# Patient Record
Sex: FEMALE | Race: WHITE | NOT HISPANIC OR LATINO | Employment: STUDENT | URBAN - METROPOLITAN AREA
[De-identification: names, ages, dates, MRNs, and addresses within clinical notes are randomized per-mention and may not be internally consistent; named-entity substitution may affect disease eponyms.]

---

## 2023-05-08 ENCOUNTER — OFFICE VISIT (OUTPATIENT)
Age: 23
End: 2023-05-08

## 2023-05-08 VITALS
RESPIRATION RATE: 20 BRPM | HEIGHT: 64 IN | TEMPERATURE: 97.8 F | SYSTOLIC BLOOD PRESSURE: 114 MMHG | WEIGHT: 145 LBS | OXYGEN SATURATION: 100 % | DIASTOLIC BLOOD PRESSURE: 66 MMHG | HEART RATE: 72 BPM | BODY MASS INDEX: 24.75 KG/M2

## 2023-05-08 DIAGNOSIS — H61.20 WAX IN EAR: Primary | ICD-10-CM

## 2023-05-08 RX ORDER — NORGESTIMATE AND ETHINYL ESTRADIOL 7DAYSX3 28
1 KIT ORAL DAILY
COMMUNITY
Start: 2023-04-03

## 2023-05-08 NOTE — PATIENT INSTRUCTIONS
Ears do not appear infected at this time from the parts of the ear drum that I can see  There is a large amount of earwax in the right ear that is obscuring over 75% of your right ear drum  OTC Debrox is recommended - 3 drops in both ears about 5 minutes before you shower  It will help to soften the ear wax in the ears so it can naturally drain out  Try not to use Q-Tips to clean the ears because it can cause wax to get pushed in further instead  Follow up with PCP in 3-5 days  Proceed to ER if symptoms worsen

## 2023-05-08 NOTE — PROGRESS NOTES
[facility]        NAME: Florencia Cleveland is a 26 y o  female  : 2000    MRN: [id number]  DATE: May 8, 2023  TIME: 7:07 PM    Assessment and Plan   Wax in ear [H61 20]  1  Wax in ear              Patient Instructions   Ears do not appear infected at this time from the parts of the ear drum that I can see  There is a large amount of earwax in the right ear that is obscuring over 75% of your right ear drum  Follow up with PCP in 3-5 days  Proceed to ER if symptoms worsen  Chief Complaint     Chief Complaint   Patient presents with   • Earache     Right ear feels clogged with limited pain x 2 days         History of Present Illness       Muffled hearing and feeling of clogged ear on the right side starting 2 days ago  Earache   Pertinent negatives include no abdominal pain, coughing, ear discharge, rash, sore throat or vomiting  Review of Systems   Review of Systems   Constitutional: Negative for chills and fever  HENT: Positive for ear pain  Negative for ear discharge and sore throat  Eyes: Negative for pain and visual disturbance  Respiratory: Negative for cough and shortness of breath  Cardiovascular: Negative for chest pain and palpitations  Gastrointestinal: Negative for abdominal pain and vomiting  Genitourinary: Negative for dysuria and hematuria  Musculoskeletal: Negative for arthralgias and back pain  Skin: Negative for color change and rash  Neurological: Negative for seizures and syncope  All other systems reviewed and are negative          Current Medications       Current Outpatient Medications:   •  norgestimate-ethinyl estradiol (ORTHO TRI-CYCLEN,TRINESSA) 0 18/0 215/0 25 MG-35 MCG per tablet, Take 1 tablet by mouth daily, Disp: , Rfl:     Current Allergies     Allergies as of 2023   • (No Known Allergies)            The following portions of the patient's history were reviewed and updated as appropriate: allergies, current medications, past "family history, past medical history, past social history, past surgical history and problem list      Past Medical History:   Diagnosis Date   • Known health problems: none        Past Surgical History:   Procedure Laterality Date   • NO PAST SURGERIES         History reviewed  No pertinent family history  Medications have been verified  Objective   /66   Pulse 72   Temp 97 8 °F (36 6 °C) (Tympanic)   Resp 20   Ht 5' 4\" (1 626 m)   Wt 65 8 kg (145 lb)   LMP 04/24/2023   SpO2 100%   BMI 24 89 kg/m²   Patient's last menstrual period was 04/24/2023  Physical Exam     Physical Exam  HENT:      Head: Normocephalic  Right Ear: Tympanic membrane, ear canal and external ear normal  There is impacted cerumen  Left Ear: Tympanic membrane, ear canal and external ear normal       Nose: Nose normal       Mouth/Throat:      Mouth: Mucous membranes are moist    Eyes:      Pupils: Pupils are equal, round, and reactive to light  Pulmonary:      Effort: Pulmonary effort is normal    Musculoskeletal:      Cervical back: Normal range of motion and neck supple  No tenderness  Skin:     General: Skin is warm and dry  Capillary Refill: Capillary refill takes less than 2 seconds  Neurological:      General: No focal deficit present  Mental Status: She is alert and oriented to person, place, and time  Mental status is at baseline                     "

## 2023-12-04 ENCOUNTER — OFFICE VISIT (OUTPATIENT)
Age: 23
End: 2023-12-04
Payer: COMMERCIAL

## 2023-12-04 VITALS
HEIGHT: 64 IN | TEMPERATURE: 97.2 F | SYSTOLIC BLOOD PRESSURE: 104 MMHG | DIASTOLIC BLOOD PRESSURE: 70 MMHG | BODY MASS INDEX: 22.96 KG/M2 | RESPIRATION RATE: 16 BRPM | WEIGHT: 134.48 LBS | HEART RATE: 91 BPM | OXYGEN SATURATION: 100 %

## 2023-12-04 DIAGNOSIS — J01.10 ACUTE NON-RECURRENT FRONTAL SINUSITIS: Primary | ICD-10-CM

## 2023-12-04 LAB — S PYO AG THROAT QL: NEGATIVE

## 2023-12-04 PROCEDURE — 99213 OFFICE O/P EST LOW 20 MIN: CPT

## 2023-12-04 PROCEDURE — 87880 STREP A ASSAY W/OPTIC: CPT

## 2023-12-04 RX ORDER — AMOXICILLIN 875 MG/1
875 TABLET, COATED ORAL 2 TIMES DAILY
Qty: 14 TABLET | Refills: 0 | Status: SHIPPED | OUTPATIENT
Start: 2023-12-04 | End: 2023-12-11

## 2023-12-04 NOTE — LETTER
December 4, 2023     Patient: Soha Liang   YOB: 2000   Date of Visit: 12/4/2023       To Whom it May Concern:    Soha Liang was seen in my clinic on 12/4/2023. She may return to school/Work on 12/5/2023. If you have any questions or concerns, please don't hesitate to call.          Sincerely,          JOSEF Ervin        CC: No Recipients

## 2023-12-04 NOTE — PATIENT INSTRUCTIONS
Strep A test was negative in the office today. You have a bacterial sinus infection at this time that I have sent antibiotics to the pharmacy to help you treat the infection. Continue taking Sudafed and other OTC cold medications to help with your symptoms while your body fights this infection. Follow up with PCP in 3-5 days if not improving. Proceed to ER if symptoms worsen.

## 2023-12-04 NOTE — PROGRESS NOTES
[facility]        NAME: Cinthia Marion is a 26 y.o. female  : 2000    MRN: [id number]  DATE: 2023  TIME: 11:38 AM    Assessment and Plan   Acute non-recurrent frontal sinusitis [J01.10]  1. Acute non-recurrent frontal sinusitis  POCT rapid strepA    amoxicillin (AMOXIL) 875 mg tablet            Patient Instructions   Strep A test was negative in the office today. You have a bacterial sinus infection at this time that I have sent antibiotics to the pharmacy to help you treat the infection. Continue taking Sudafed and other OTC cold medications to help with your symptoms while your body fights this infection. Follow up with PCP in 3-5 days if not improving. Proceed to ER if symptoms worsen. Chief Complaint     Chief Complaint   Patient presents with   • Sore Throat     Congestion x2 weeks. OTC cold meds and sudafed. Woke up this morning with swollen tonsils and "white stuff in throat". States that throat does not hurt very badly. Denies fevers. History of Present Illness       Congestion for 2 weeks, was feeling better 2 days ago and then worse yesterday. Woke up this morning with sore throat and "white stuff" on her throat. Review of Systems   Review of Systems   Constitutional:  Negative for chills and fever. HENT:  Positive for congestion, sinus pressure and sore throat. Negative for ear pain and sinus pain. Eyes:  Negative for pain and visual disturbance. Respiratory:  Negative for cough and shortness of breath. Cardiovascular:  Negative for chest pain and palpitations. Gastrointestinal:  Negative for abdominal pain and vomiting. Genitourinary:  Negative for dysuria and hematuria. Musculoskeletal:  Positive for myalgias. Negative for arthralgias and back pain. Skin:  Negative for color change and rash. Neurological:  Positive for headaches. Negative for dizziness, seizures, syncope, weakness and numbness.    All other systems reviewed and are negative. Current Medications       Current Outpatient Medications:   •  amoxicillin (AMOXIL) 875 mg tablet, Take 1 tablet (875 mg total) by mouth 2 (two) times a day for 7 days, Disp: 14 tablet, Rfl: 0  •  norgestimate-ethinyl estradiol (ORTHO TRI-CYCLEN,TRINESSA) 0.18/0.215/0.25 MG-35 MCG per tablet, Take 1 tablet by mouth daily, Disp: , Rfl:     Current Allergies     Allergies as of 12/04/2023   • (No Known Allergies)            The following portions of the patient's history were reviewed and updated as appropriate: allergies, current medications, past family history, past medical history, past social history, past surgical history and problem list.     Past Medical History:   Diagnosis Date   • Known health problems: none        Past Surgical History:   Procedure Laterality Date   • NO PAST SURGERIES         History reviewed. No pertinent family history. Medications have been verified. Objective   /70 (BP Location: Left arm, Patient Position: Sitting, Cuff Size: Standard)   Pulse 91   Temp (!) 97.2 °F (36.2 °C) (Tympanic)   Resp 16   Ht 5' 4" (1.626 m)   Wt 61 kg (134 lb 7.7 oz)   LMP 11/20/2023   SpO2 100%   BMI 23.08 kg/m²   Patient's last menstrual period was 11/20/2023. Physical Exam     Physical Exam  Vitals and nursing note reviewed. Constitutional:       Appearance: Normal appearance. She is well-developed. HENT:      Head: Normocephalic and atraumatic. Right Ear: Tympanic membrane normal.      Left Ear: Tympanic membrane normal.      Nose: Congestion present. Right Sinus: Frontal sinus tenderness present. No maxillary sinus tenderness. Left Sinus: Frontal sinus tenderness present. No maxillary sinus tenderness. Mouth/Throat:      Mouth: Mucous membranes are moist.      Pharynx: No oropharyngeal exudate or posterior oropharyngeal erythema. Tonsils: No tonsillar exudate. 0 on the right. 0 on the left.    Eyes:      Conjunctiva/sclera: Conjunctivae normal.   Cardiovascular:      Rate and Rhythm: Normal rate. Heart sounds: Normal heart sounds. Pulmonary:      Effort: Pulmonary effort is normal.   Musculoskeletal:      Cervical back: Normal range of motion and neck supple. Skin:     General: Skin is warm and dry. Capillary Refill: Capillary refill takes less than 2 seconds. Neurological:      General: No focal deficit present. Mental Status: She is alert and oriented to person, place, and time. Mental status is at baseline. Sensory: No sensory deficit. Motor: No weakness. Psychiatric:         Mood and Affect: Mood normal.         Behavior: Behavior normal.         Thought Content:  Thought content normal.

## 2024-04-22 ENCOUNTER — OFFICE VISIT (OUTPATIENT)
Age: 24
End: 2024-04-22
Payer: COMMERCIAL

## 2024-04-22 VITALS
HEIGHT: 65 IN | WEIGHT: 139.77 LBS | RESPIRATION RATE: 18 BRPM | SYSTOLIC BLOOD PRESSURE: 104 MMHG | OXYGEN SATURATION: 100 % | BODY MASS INDEX: 23.29 KG/M2 | TEMPERATURE: 98.3 F | DIASTOLIC BLOOD PRESSURE: 68 MMHG | HEART RATE: 102 BPM

## 2024-04-22 DIAGNOSIS — B34.9 VIRAL SYNDROME: Primary | ICD-10-CM

## 2024-04-22 LAB — S PYO AG THROAT QL: NEGATIVE

## 2024-04-22 PROCEDURE — 99213 OFFICE O/P EST LOW 20 MIN: CPT | Performed by: EMERGENCY MEDICINE

## 2024-04-22 PROCEDURE — 87880 STREP A ASSAY W/OPTIC: CPT | Performed by: EMERGENCY MEDICINE

## 2024-04-22 RX ORDER — ONDANSETRON 4 MG/1
4 TABLET, FILM COATED ORAL EVERY 8 HOURS PRN
Qty: 12 TABLET | Refills: 0 | Status: SHIPPED | OUTPATIENT
Start: 2024-04-22

## 2024-04-22 RX ORDER — PREDNISONE 10 MG/1
TABLET ORAL
Qty: 27 TABLET | Refills: 0 | Status: SHIPPED | OUTPATIENT
Start: 2024-04-22

## 2024-04-22 NOTE — PROGRESS NOTES
[facility]        NAME: Raisa Mondragon is a 24 y.o. female  : 2000    MRN: [id number]  DATE: 2024  TIME: 12:58 PM    Assessment and Plan   Viral syndrome [B34.9]  1. Viral syndrome  POCT rapid ANTIGEN strepA    ondansetron (ZOFRAN) 4 mg tablet    predniSONE 10 mg tablet            Patient Instructions     Patient Instructions   You have been diagnosed with a Viral Syndrome infection and your symptoms should resolve over the next 7 to 10 days with the treatments recommended today.  If they do not, it is possible that you have developed a bacterial infection and you should return.  If you were to take an antibiotic while you are still in the viral stage, you will not get better any faster, but could kill off many of the good germs in your body as well as make the germs in you resistant to the antibiotic..  Take an expectorant - guaifenesin should be the only ingredient - during the day, and the cough suppressant (ex. Robitussin DM or Tessalon) if needed at night only.   Take Zinc 25 mg every 12 hours for the next week (the dose is important so do not just take a multivitamin with zinc or an over-the-counter cold med with zinc such as Airborne or Zicam, as that will not give you the sufficient dose).    You should also take Vitamin D3 5000 i.u.s per day for the next 1 week, and Vitamin-C 1 g twice daily (and again dosages are important, do not think you are getting enough vitamin C just by drinking extra orange juice).  You may also take Imodium as needed for diarrhea.  Viral Syndrome   WHAT YOU NEED TO KNOW:   What is viral syndrome?  Viral syndrome is a term used for symptoms of an infection caused by a virus. Viruses are spread easily from person to person on shared items.  What are the signs and symptoms of viral syndrome?  Signs and symptoms may start slowly or suddenly and last hours to days. They can be mild to severe and can change over days or hours. You may have any of the  following:  Fever and chills    A runny or stuffy nose    Cough, sore throat, or hoarseness    Headache, or pain and pressure around your eyes    Muscle aches and joint pain    Shortness of breath or wheezing    Abdominal pain, cramps, and diarrhea    Nausea, vomiting, or loss of appetite    How is viral syndrome diagnosed and treated?  Your healthcare provider will ask about your symptoms and examine you. Antibiotics are not given for a viral infection. The following may help you feel better:  Acetaminophen  decreases pain and fever. It is available without a doctor's order. Ask how much to take and how often to take it. Follow directions. Read the labels of all other medicines you are using to see if they also contain acetaminophen, or ask your doctor or pharmacist. Acetaminophen can cause liver damage if not taken correctly.    NSAIDs , such as ibuprofen, help decrease swelling, pain, and fever. NSAIDs can cause stomach bleeding or kidney problems in certain people. If you take blood thinner medicine, always ask your healthcare provider if NSAIDs are safe for you. Always read the medicine label and follow directions.    Cold medicine  helps decrease swelling, control a cough, and relieve chest or nasal congestion.    Saline nasal spray  helps relieve congestion in your sinuses.    How can I manage my symptoms?   Drink liquids as directed to prevent dehydration.  Ask how much liquid to drink each day and which liquids are best for you. Do not drink liquids with caffeine. Caffeine can make dehydration worse.     Get plenty of rest to help your body heal.  Take naps throughout the day. Ask your healthcare provider when you can return to work and your normal activities.    Use a cool mist humidifier  to increase air moisture in your home. This may make it easier for you to breathe and help decrease your cough.     Drink warm tea with honey or use cough drops for a sore throat.  Cough drops are available without a  doctor's order. Follow directions for taking cough drops.    Do not smoke or be close to anyone who is smoking.  Nicotine and other chemicals in cigarettes and cigars can cause lung damage. Smoking can also delay healing. Ask your healthcare provider for information if you currently smoke and need help to quit. E-cigarettes or smokeless tobacco still contain nicotine. Talk to your healthcare provider before you use these products.    What can I do to prevent the spread of germs?   Wash your hands often throughout the day.  Use soap and water. Rub your soapy hands together, lacing your fingers, for at least 20 seconds. Rinse with warm, running water. Dry your hands with a clean towel or paper towel. Use hand  that contains alcohol if soap and water are not available.         Cover sneezes and coughs.  Turn your face away and cover your mouth and nose with a tissue. Throw the tissue away. Use the bend of your arm if a tissue is not available. Then wash your hands well with soap and water or use hand .    Stay home while you are sick.  Avoid crowds as much as possible.    Get the influenza (flu) vaccine as soon as recommended each year.  The flu vaccine is available starting in September or October. Ask your healthcare provider about the pneumonia vaccine. This vaccine is usually recommended every 5 years in older adults.       Call your local emergency number (911 in the [place]), or have someone call if:   You have a seizure.    You cannot be woken.    You have chest pain or trouble breathing.    When should I seek immediate care?   You have a stiff neck, a bad headache, and sensitivity to light.    You feel weak, dizzy, or confused.    You stop urinating or urinate a lot less than usual.    You cough up blood or thick yellow or green mucus.    You have severe abdominal pain or your abdomen is larger than usual.    When should I call my doctor?   Your symptoms do not get better with treatment or get  worse after 3 days.    You have a rash or ear pain.    You have burning when you urinate.    You have questions or concerns about your condition or care.    CARE AGREEMENT:   You have the right to help plan your care. Learn about your health condition and how it may be treated. Discuss treatment options with your healthcare providers to decide what care you want to receive. You always have the right to refuse treatment. The above information is an  only. It is not intended as medical advice for individual conditions or treatments. Talk to your doctor, nurse or pharmacist before following any medical regimen to see if it is safe and effective for you.  © Copyright [employer] 2023 Information is for End User's use only and may not be sold, redistributed or otherwise used for commercial purposes.    Acute Nausea and Vomiting   AMBULATORY CARE:   Acute nausea and vomiting  means the nausea and vomiting starts suddenly, gets worse quickly, and lasts a short time. There are many possible causes of acute nausea and vomiting.  Call your local emergency number (911 in the ) if:   You have chest pain.    You have severe pain or cramping in your abdomen.    Your vision is blurred.    You are confused, have a high fever, or a stiff neck.    You have bright red blood coming from your rectum.    Your vomit smells like bowel movement.    Seek care immediately if:   You have a severe headache or pain.    You are dizzy, cold, and thirsty, and your eyes and mouth are dry.    You are urinating very little or not at all.    You are dizzy or lightheaded when you stand up.    You see blood or material that looks like coffee grounds in your vomit.    Call your doctor if:   You continue to vomit for more than 48 hours.    Your nausea and vomiting does not get better or go away after you use medicine.    You have questions or concerns about your condition or care.    Treatment:  The first goal of treatment for nausea and  vomiting is to prevent or treat dehydration. Treatment also depends on the cause of the nausea and vomiting. Any medical condition causing your nausea and vomiting will also be treated. You may need one or more of the following:  Medicines  may be given to calm your stomach and stop your vomiting. You may also need medicines to help empty your stomach and bowels.    IV fluids  may be given to replace lost fluids and electrolytes. This may be needed it you cannot drink liquids.    A nasogastric (NG) tube  may be needed if your nausea and vomiting is severe. The NG tube is put into your nose and moved down your throat until it reaches your stomach. Liquid, nutrition, or medicine may be given through an NG tube. The tube may instead be attached to suction if healthcare providers need to keep your stomach empty.    Manage your symptoms:   Rest as much as you can.  Too much activity can make your nausea worse.    Drink more liquids to prevent dehydration.  Take small sips. Try drinks such as ginger ale, lemonade, water, or tea. Your provider may recommend that you drink an oral rehydration solution (ORS). ORS contains water, salts, and sugar that are needed to replace the lost body fluids.    Eat smaller meals, more often.  Try bland foods and avoid spices or strong flavors    Do not drink alcohol.  Alcohol may upset or irritate your stomach.    Follow up with your doctor as directed:  Write down your questions so you remember to ask them during your visits.  © Copyright [employer] 2023 Information is for End User's use only and may not be sold, redistributed or otherwise used for commercial purposes.  The above information is an  only. It is not intended as medical advice for individual conditions or treatments. Talk to your doctor, nurse or pharmacist before following any medical regimen to see if it is safe and effective for you.      Follow up with PCP in 3-5 days.  Proceed to  ER if symptoms  worsen.    Chief Complaint     Chief Complaint   Patient presents with    Abdominal Pain     Saturday started feeling nausea after eating. Stomach super upset. Been able to keeping liquids down, having decreased appetite.     Sore Throat     Sore throat began Sunday. No fever or chills. No pain with swallowing.          History of Present Illness       Complains of sore throat, congestion, nausea, decreased appetite, crampy abdominal pain the past 2 days.    Abdominal Pain  Associated symptoms include nausea and a sore throat. Pertinent negatives include no diarrhea, fever, headaches, rash or vomiting.   Sore Throat   Associated symptoms include abdominal pain. Pertinent negatives include no coughing, diarrhea, headaches, shortness of breath, trouble swallowing or vomiting.       Review of Systems   Review of Systems   Constitutional:  Positive for appetite change. Negative for chills and fever.   HENT:  Positive for sore throat. Negative for sinus pressure, trouble swallowing and voice change.    Respiratory:  Negative for cough, chest tightness, shortness of breath and wheezing.    Cardiovascular:  Negative for chest pain.   Gastrointestinal:  Positive for abdominal pain and nausea. Negative for abdominal distention, blood in stool, diarrhea and vomiting.   Skin:  Negative for rash.   Neurological:  Negative for headaches.         Current Medications       Current Outpatient Medications:     norgestimate-ethinyl estradiol (ORTHO TRI-CYCLEN,TRINESSA) 0.18/0.215/0.25 MG-35 MCG per tablet, Take 1 tablet by mouth daily, Disp: , Rfl:     ondansetron (ZOFRAN) 4 mg tablet, Take 1 tablet (4 mg total) by mouth every 8 (eight) hours as needed for nausea or vomiting, Disp: 12 tablet, Rfl: 0    predniSONE 10 mg tablet, Take once daily all days pills on this schedule 6- 6- 5- 4- 3- 2- 1, Disp: 27 tablet, Rfl: 0    Current Allergies     Allergies as of 04/22/2024    (No Known Allergies)            The following portions of the  "patient's history were reviewed and updated as appropriate: allergies, current medications, past family history, past medical history, past social history, past surgical history and problem list.     Past Medical History:   Diagnosis Date    Known health problems: none        Past Surgical History:   Procedure Laterality Date    ADENOIDECTOMY      EAR TUBE REMOVAL      NO PAST SURGERIES         History reviewed. No pertinent family history.      Medications have been verified.        Objective   /68   Pulse 102   Temp 98.3 °F (36.8 °C)   Resp 18   Ht 5' 5\" (1.651 m)   Wt 63.4 kg (139 lb 12.4 oz)   LMP 04/01/2024   SpO2 100%   BMI 23.26 kg/m²        Physical Exam     Physical Exam  Vitals and nursing note reviewed.   Constitutional:       General: She is not in acute distress.     Appearance: She is well-developed. She is not ill-appearing or toxic-appearing.   HENT:      Head: Normocephalic and atraumatic.      Right Ear: External ear normal.      Left Ear: External ear normal.      Nose: Mucosal edema and congestion present.      Mouth/Throat:      Pharynx: Posterior oropharyngeal erythema present. No oropharyngeal exudate.      Tonsils: No tonsillar abscesses.   Cardiovascular:      Rate and Rhythm: Normal rate and regular rhythm.   Pulmonary:      Effort: Pulmonary effort is normal. No respiratory distress.      Breath sounds: No wheezing or rales.   Abdominal:      General: Abdomen is flat. Bowel sounds are normal. There is no distension.      Palpations: Abdomen is soft.      Tenderness: There is no abdominal tenderness.   Musculoskeletal:      Cervical back: Neck supple.   Skin:     General: Skin is warm and dry.   Neurological:      Mental Status: She is alert and oriented to person, place, and time.   Psychiatric:         Mood and Affect: Mood normal.         Behavior: Behavior normal.         Thought Content: Thought content normal.         Judgment: Judgment normal.                   "

## 2024-04-22 NOTE — PATIENT INSTRUCTIONS
You have been diagnosed with a Viral Syndrome infection and your symptoms should resolve over the next 7 to 10 days with the treatments recommended today.  If they do not, it is possible that you have developed a bacterial infection and you should return.  If you were to take an antibiotic while you are still in the viral stage, you will not get better any faster, but could kill off many of the good germs in your body as well as make the germs in you resistant to the antibiotic..  Take an expectorant - guaifenesin should be the only ingredient - during the day, and the cough suppressant (ex. Robitussin DM or Tessalon) if needed at night only.   Take Zinc 25 mg every 12 hours for the next week (the dose is important so do not just take a multivitamin with zinc or an over-the-counter cold med with zinc such as Airborne or Zicam, as that will not give you the sufficient dose).    You should also take Vitamin D3 5000 i.u.s per day for the next 1 week, and Vitamin-C 1 g twice daily (and again dosages are important, do not think you are getting enough vitamin C just by drinking extra orange juice).  You may also take Imodium as needed for diarrhea.  Viral Syndrome   WHAT YOU NEED TO KNOW:   What is viral syndrome?  Viral syndrome is a term used for symptoms of an infection caused by a virus. Viruses are spread easily from person to person on shared items.  What are the signs and symptoms of viral syndrome?  Signs and symptoms may start slowly or suddenly and last hours to days. They can be mild to severe and can change over days or hours. You may have any of the following:  Fever and chills    A runny or stuffy nose    Cough, sore throat, or hoarseness    Headache, or pain and pressure around your eyes    Muscle aches and joint pain    Shortness of breath or wheezing    Abdominal pain, cramps, and diarrhea    Nausea, vomiting, or loss of appetite    How is viral syndrome diagnosed and treated?  Your healthcare provider  will ask about your symptoms and examine you. Antibiotics are not given for a viral infection. The following may help you feel better:  Acetaminophen  decreases pain and fever. It is available without a doctor's order. Ask how much to take and how often to take it. Follow directions. Read the labels of all other medicines you are using to see if they also contain acetaminophen, or ask your doctor or pharmacist. Acetaminophen can cause liver damage if not taken correctly.    NSAIDs , such as ibuprofen, help decrease swelling, pain, and fever. NSAIDs can cause stomach bleeding or kidney problems in certain people. If you take blood thinner medicine, always ask your healthcare provider if NSAIDs are safe for you. Always read the medicine label and follow directions.    Cold medicine  helps decrease swelling, control a cough, and relieve chest or nasal congestion.    Saline nasal spray  helps relieve congestion in your sinuses.    How can I manage my symptoms?   Drink liquids as directed to prevent dehydration.  Ask how much liquid to drink each day and which liquids are best for you. Do not drink liquids with caffeine. Caffeine can make dehydration worse.     Get plenty of rest to help your body heal.  Take naps throughout the day. Ask your healthcare provider when you can return to work and your normal activities.    Use a cool mist humidifier  to increase air moisture in your home. This may make it easier for you to breathe and help decrease your cough.     Drink warm tea with honey or use cough drops for a sore throat.  Cough drops are available without a doctor's order. Follow directions for taking cough drops.    Do not smoke or be close to anyone who is smoking.  Nicotine and other chemicals in cigarettes and cigars can cause lung damage. Smoking can also delay healing. Ask your healthcare provider for information if you currently smoke and need help to quit. E-cigarettes or smokeless tobacco still contain  nicotine. Talk to your healthcare provider before you use these products.    What can I do to prevent the spread of germs?   Wash your hands often throughout the day.  Use soap and water. Rub your soapy hands together, lacing your fingers, for at least 20 seconds. Rinse with warm, running water. Dry your hands with a clean towel or paper towel. Use hand  that contains alcohol if soap and water are not available.         Cover sneezes and coughs.  Turn your face away and cover your mouth and nose with a tissue. Throw the tissue away. Use the bend of your arm if a tissue is not available. Then wash your hands well with soap and water or use hand .    Stay home while you are sick.  Avoid crowds as much as possible.    Get the influenza (flu) vaccine as soon as recommended each year.  The flu vaccine is available starting in September or October. Ask your healthcare provider about the pneumonia vaccine. This vaccine is usually recommended every 5 years in older adults.       Call your local emergency number (911 in the ), or have someone call if:   You have a seizure.    You cannot be woken.    You have chest pain or trouble breathing.    When should I seek immediate care?   You have a stiff neck, a bad headache, and sensitivity to light.    You feel weak, dizzy, or confused.    You stop urinating or urinate a lot less than usual.    You cough up blood or thick yellow or green mucus.    You have severe abdominal pain or your abdomen is larger than usual.    When should I call my doctor?   Your symptoms do not get better with treatment or get worse after 3 days.    You have a rash or ear pain.    You have burning when you urinate.    You have questions or concerns about your condition or care.    CARE AGREEMENT:   You have the right to help plan your care. Learn about your health condition and how it may be treated. Discuss treatment options with your healthcare providers to decide what care you want  to receive. You always have the right to refuse treatment. The above information is an  only. It is not intended as medical advice for individual conditions or treatments. Talk to your doctor, nurse or pharmacist before following any medical regimen to see if it is safe and effective for you.  © Copyright [employer] 2023 Information is for End User's use only and may not be sold, redistributed or otherwise used for commercial purposes.    Acute Nausea and Vomiting   AMBULATORY CARE:   Acute nausea and vomiting  means the nausea and vomiting starts suddenly, gets worse quickly, and lasts a short time. There are many possible causes of acute nausea and vomiting.  Call your local emergency number (911 in the ) if:   You have chest pain.    You have severe pain or cramping in your abdomen.    Your vision is blurred.    You are confused, have a high fever, or a stiff neck.    You have bright red blood coming from your rectum.    Your vomit smells like bowel movement.    Seek care immediately if:   You have a severe headache or pain.    You are dizzy, cold, and thirsty, and your eyes and mouth are dry.    You are urinating very little or not at all.    You are dizzy or lightheaded when you stand up.    You see blood or material that looks like coffee grounds in your vomit.    Call your doctor if:   You continue to vomit for more than 48 hours.    Your nausea and vomiting does not get better or go away after you use medicine.    You have questions or concerns about your condition or care.    Treatment:  The first goal of treatment for nausea and vomiting is to prevent or treat dehydration. Treatment also depends on the cause of the nausea and vomiting. Any medical condition causing your nausea and vomiting will also be treated. You may need one or more of the following:  Medicines  may be given to calm your stomach and stop your vomiting. You may also need medicines to help empty your stomach and  bowels.    IV fluids  may be given to replace lost fluids and electrolytes. This may be needed it you cannot drink liquids.    A nasogastric (NG) tube  may be needed if your nausea and vomiting is severe. The NG tube is put into your nose and moved down your throat until it reaches your stomach. Liquid, nutrition, or medicine may be given through an NG tube. The tube may instead be attached to suction if healthcare providers need to keep your stomach empty.    Manage your symptoms:   Rest as much as you can.  Too much activity can make your nausea worse.    Drink more liquids to prevent dehydration.  Take small sips. Try drinks such as ginger ale, lemonade, water, or tea. Your provider may recommend that you drink an oral rehydration solution (ORS). ORS contains water, salts, and sugar that are needed to replace the lost body fluids.    Eat smaller meals, more often.  Try bland foods and avoid spices or strong flavors    Do not drink alcohol.  Alcohol may upset or irritate your stomach.    Follow up with your doctor as directed:  Write down your questions so you remember to ask them during your visits.  © Copyright [employer] 2023 Information is for End User's use only and may not be sold, redistributed or otherwise used for commercial purposes.  The above information is an  only. It is not intended as medical advice for individual conditions or treatments. Talk to your doctor, nurse or pharmacist before following any medical regimen to see if it is safe and effective for you.

## 2024-10-22 ENCOUNTER — OFFICE VISIT (OUTPATIENT)
Age: 24
End: 2024-10-22
Payer: COMMERCIAL

## 2024-10-22 VITALS
SYSTOLIC BLOOD PRESSURE: 110 MMHG | RESPIRATION RATE: 18 BRPM | OXYGEN SATURATION: 100 % | WEIGHT: 138.89 LBS | DIASTOLIC BLOOD PRESSURE: 72 MMHG | TEMPERATURE: 98.2 F | HEIGHT: 64 IN | BODY MASS INDEX: 23.71 KG/M2 | HEART RATE: 81 BPM

## 2024-10-22 DIAGNOSIS — J02.9 SORE THROAT: ICD-10-CM

## 2024-10-22 DIAGNOSIS — J02.9 ACUTE PHARYNGITIS, UNSPECIFIED ETIOLOGY: Primary | ICD-10-CM

## 2024-10-22 LAB — S PYO AG THROAT QL: NEGATIVE

## 2024-10-22 PROCEDURE — S9083 URGENT CARE CENTER GLOBAL: HCPCS

## 2024-10-22 PROCEDURE — 87880 STREP A ASSAY W/OPTIC: CPT

## 2024-10-22 PROCEDURE — G0382 LEV 3 HOSP TYPE B ED VISIT: HCPCS

## 2024-10-22 NOTE — PATIENT INSTRUCTIONS
Patient Education     Sore throat in adults   The Basics   Written by the doctors and editors at [facility]   What causes sore throat? -- Sore throat is usually caused by an infection. Two types of germs can cause it: viruses and bacteria.  People who have a sore throat caused by a virus do not usually need to see a doctor or nurse. But if you think that you might have been exposed to COVID-19, or if COVID-19 is common where you live, ask your doctor or nurse if you should be tested.  People who have a sore throat caused by bacteria might need to see a doctor or nurse. They might have a type of infection called strep throat. Only about 1 in 10 adults who seek medical care for sore throat have strep throat.  How can I tell if my sore throat is caused by a virus or strep throat? -- It is hard to tell the difference. But there are some clues to look for.  People who have a sore throat caused by a virus usually have other symptoms, such as:   Stuffy or runny nose   Itchy or red eyes   Cough  People who have COVID-19 can have a sore throat as their only symptom. Or they can have other symptoms such as fever, cough, trouble breathing, and problems with their sense of smell or taste. In most cases, the only way to know for sure if you have COVID-19 is to get tested.  People who have strep throat do not usually have a cough, runny nose, or itchy or red eyes. They might have:   Severe throat pain   Fever (temperature higher than 100.4°F or 38°C)   Swollen glands in the neck  If you think that you have strep throat, the doctor or nurse can easily check. They can take a sample from the back of your throat and test it for the bacteria that cause strep throat.  Do I need antibiotics? -- If you have an infection caused by a virus, you do not need antibiotics. But if you have strep throat, you should get antibiotics. Most people with strep throat get better without antibiotics, but doctors and nurses often prescribe them. This is  "because antibiotics often can prevent other problems that might be caused by strep throat. Plus, antibiotics can reduce the symptoms of strep throat and prevent you from spreading it to other people.  What can I do to feel better? -- To relieve the pain of sore throat, you can:   Take over-the-counter pain medicine - Acetaminophen (sample brand name: Tylenol) or ibuprofen (sample brand names: Advil, Motrin) can help with throat pain.   Use sore throat lozenges or sprays - Using medicated sore throat lozenges or throat sprays can temporarily reduce throat pain.   Suck on hard candies, ice chips, or ice pops.   Gargle with salt water - Some people find that this helps with throat pain.   Use a cool mist humidifier - This adds moisture to the air to keep the throat from getting too dry and might help with pain.   Avoid smoking or being around people who are smoking - Smoke can make throat pain worse.  When can I go back to work or school? -- If you have strep throat, wait 1 day after starting antibiotics. By then, you will be a lot less likely to spread the infection to others.  If you have COVID-19, stay home from work or school and \"self-isolate\" until your doctor or nurse tells you it's safe to stop. Self-isolation means staying apart from other people, even the people you live with. When you can stop self-isolation depends on how long it has been since you had symptoms, and in some cases, whether you have had a negative test (showing that the virus is no longer in your body).  If you have a sore throat that is not due to strep throat or COVID-19, you can go back to your usual activities as soon as you feel well. But it's still important to wash your hands often and cover your mouth if you cough.  When should I call the doctor? -- Call your doctor or nurse if:   You have a fever of at least 101°F (38.4°C).   Your throat pain is severe within the first 2 days, or does not start to improve within 5 to 7 days.   You " are having trouble getting enough to eat or drink.   You got antibiotics but still have symptoms after finishing them.  Call for an ambulance (in the [place] and [place], call 9-1-1) or go to the emergency department if you:   Have trouble breathing   Are drooling because you cannot swallow your saliva   Have swelling of the neck or tongue   Cannot move your neck, or have trouble opening your mouth  What can I do to prevent getting a sore throat again? -- Wash your hands often with soap and water (figure 1). It is one of the best ways to prevent the spread of infection.  All topics are updated as new evidence becomes available and our peer review process is complete.  This topic retrieved from [employer] on: Mar 13, 2024.  Topic 94674 Version 23.0  Release: 32.2.4 - C32.71  © 2024 UpToDate, Inc. and/or its affiliates. All rights reserved.  figure 1: How to wash your hands     Wet your hands with clean water, and apply a small amount of soap. Lather and rub hands together for at least 20 seconds. Clean your wrists, palms, backs of your hands, between your fingers, tips of your fingers, thumbs, and under and around your nails. Rinse well, and dry your hands using a clean towel.  Graphic 363225 Version 7.0  Consumer Information Use and Disclaimer   Disclaimer: This generalized information is a limited summary of diagnosis, treatment, and/or medication information. It is not meant to be comprehensive and should be used as a tool to help the user understand and/or assess potential diagnostic and treatment options. It does NOT include all information about conditions, treatments, medications, side effects, or risks that may apply to a specific patient. It is not intended to be medical advice or a substitute for the medical advice, diagnosis, or treatment of a health care provider based on the health care provider's examination and assessment of a patient's specific and unique circumstances. Patients must speak with a health care  provider for complete information about their health, medical questions, and treatment options, including any risks or benefits regarding use of medications. This information does not endorse any treatments or medications as safe, effective, or approved for treating a specific patient. UpToDate, Inc. and its affiliates disclaim any warranty or liability relating to this information or the use thereof.The use of this information is governed by the Terms of Use, available at https://www.woltersNewtopiauwer.com/en/know/clinical-effectiveness-terms. 2024© UpToDate, Inc. and its affiliates and/or licensors. All rights reserved.  Copyright   © 2024 UpToDate, Inc. and/or its affiliates. All rights reserved.

## 2024-10-22 NOTE — PROGRESS NOTES
[facility]    NAME: Raisa Mondragon is a 24 y.o. female  : 2000    MRN: [id number]  DATE: 2024  TIME: 5:12 PM    Assessment and Plan   Acute pharyngitis, unspecified etiology [J02.9]  1. Acute pharyngitis, unspecified etiology        2. Sore throat  POCT rapid ANTIGEN strepA    Throat culture      Supportive care as discussed. Hydration, humidifier, rest. Recommended ibuprofen and chloraseptic. Will call if strep positive. Patient agreeable to plan.  Patient Instructions     Rapid strep completed in office today. Negative rapid strep - will send for culture.  Follow-up with PCP in the next 3-5 days if no improvement.   Go to the ED if symptoms severely worsen.    Chief Complaint     Chief Complaint   Patient presents with    Sore Throat     X2 days. Sinus infection about two weeks ago. Only symptom now is sore throat and post nasal drip. 2/10 pain.      History of Present Illness     Raisa Mondragon is a 24 y.o. female presenting to the office today for sore throat. Symptoms have been present for 2 days, and include sore throat and postnasal drip.   She has tried Tylenol severe for her symptoms, some relief.    Review of Systems     Review of Systems   Constitutional:  Negative for chills and fever.   HENT:  Positive for sore throat. Negative for congestion (had that 2 weeks ago and it resolved), ear pain, sinus pressure and trouble swallowing.    Respiratory:  Negative for cough, shortness of breath and wheezing.    Gastrointestinal:  Negative for nausea and vomiting.   Genitourinary: Negative.    Musculoskeletal: Negative.    Skin: Negative.    Neurological: Negative.        Current Medications       Current Outpatient Medications:     norgestimate-ethinyl estradiol (ORTHO TRI-CYCLEN,TRINESSA) 0.18/0.215/0.25 MG-35 MCG per tablet, Take 1 tablet by mouth daily, Disp: , Rfl:     ondansetron (ZOFRAN) 4 mg tablet, Take 1 tablet (4 mg total) by mouth every 8 (eight) hours as needed for nausea or  "vomiting (Patient not taking: Reported on 10/22/2024), Disp: 12 tablet, Rfl: 0    predniSONE 10 mg tablet, Take once daily all days pills on this schedule 6- 6- 5- 4- 3- 2- 1 (Patient not taking: Reported on 10/22/2024), Disp: 27 tablet, Rfl: 0    Current Allergies     Allergies as of 10/22/2024    (No Known Allergies)            The following portions of the patient's history were reviewed and updated as appropriate: allergies, current medications, past family history, past medical history, past social history, past surgical history and problem list.     Past Medical History:   Diagnosis Date    Known health problems: none        Past Surgical History:   Procedure Laterality Date    ADENOIDECTOMY      EAR TUBE REMOVAL      NO PAST SURGERIES         Family History   Problem Relation Age of Onset    No Known Problems Mother     No Known Problems Father        Medications have been verified.    Objective     /72   Pulse 81   Temp 98.2 °F (36.8 °C)   Resp 18   Ht 5' 4\" (1.626 m)   Wt 63 kg (138 lb 14.2 oz)   LMP 10/16/2024 (Exact Date)   SpO2 100%   BMI 23.84 kg/m²   Patient's last menstrual period was 10/16/2024 (exact date).     Physical Exam     Physical Exam  Vitals and nursing note reviewed.   Constitutional:       General: She is not in acute distress.     Appearance: Normal appearance. She is normal weight. She is not ill-appearing, toxic-appearing or diaphoretic.   HENT:      Head: Normocephalic and atraumatic.      Right Ear: Tympanic membrane, ear canal and external ear normal. No drainage, swelling or tenderness. No middle ear effusion. There is no impacted cerumen. Tympanic membrane is not erythematous.      Left Ear: Tympanic membrane, ear canal and external ear normal. No drainage, swelling or tenderness.  No middle ear effusion. There is no impacted cerumen. Tympanic membrane is not erythematous.      Nose: No congestion or rhinorrhea.      Mouth/Throat:      Mouth: Mucous membranes are " moist. No oral lesions.      Pharynx: Uvula midline. Posterior oropharyngeal erythema present. No pharyngeal swelling, oropharyngeal exudate or uvula swelling.      Tonsils: No tonsillar exudate or tonsillar abscesses. 2+ on the right. 2+ on the left.   Eyes:      General: No scleral icterus.        Right eye: No discharge.         Left eye: No discharge.      Conjunctiva/sclera: Conjunctivae normal.   Cardiovascular:      Rate and Rhythm: Normal rate and regular rhythm.      Pulses: Normal pulses.      Heart sounds: Normal heart sounds. No murmur heard.     No friction rub. No gallop.   Pulmonary:      Effort: Pulmonary effort is normal. No respiratory distress.      Breath sounds: Normal breath sounds. No stridor. No wheezing, rhonchi or rales.   Chest:      Chest wall: No tenderness.   Musculoskeletal:         General: Normal range of motion.      Cervical back: Normal range of motion and neck supple. No rigidity or tenderness.   Lymphadenopathy:      Cervical: No cervical adenopathy.   Skin:     General: Skin is warm and dry.      Capillary Refill: Capillary refill takes less than 2 seconds.      Coloration: Skin is not jaundiced.      Findings: No bruising or rash.   Neurological:      General: No focal deficit present.      Mental Status: She is alert. Mental status is at baseline.   Psychiatric:         Mood and Affect: Mood normal.         Behavior: Behavior normal.